# Patient Record
Sex: MALE | Race: BLACK OR AFRICAN AMERICAN | URBAN - METROPOLITAN AREA
[De-identification: names, ages, dates, MRNs, and addresses within clinical notes are randomized per-mention and may not be internally consistent; named-entity substitution may affect disease eponyms.]

---

## 2020-08-13 ENCOUNTER — OFFICE VISIT (OUTPATIENT)
Dept: URBAN - METROPOLITAN AREA TELEHEALTH 2 | Facility: TELEHEALTH | Age: 60
End: 2020-08-13
Payer: COMMERCIAL

## 2020-08-13 DIAGNOSIS — K50.80 CROHN'S COLITIS: ICD-10-CM

## 2020-08-13 DIAGNOSIS — K29.60 ADENOPAPILLOMATOSIS GASTRICA: ICD-10-CM

## 2020-08-13 DIAGNOSIS — R10.12 LUQ ABDOMINAL PAIN: ICD-10-CM

## 2020-08-13 DIAGNOSIS — R19.7 DIARRHEA: ICD-10-CM

## 2020-08-13 PROCEDURE — 99214 OFFICE O/P EST MOD 30 MIN: CPT | Performed by: INTERNAL MEDICINE

## 2020-08-13 RX ORDER — MESALAMINE 1.2 G/1
4 TABLETS TABLET, DELAYED RELEASE ORAL ONCE A DAY
Qty: 360 TABLET | Refills: 3

## 2020-08-13 RX ORDER — FAMOTIDINE 40 MG/1
1 TABLET AT BEDTIME TABLET, FILM COATED ORAL ONCE A DAY
Qty: 90 TABLET | Refills: 3

## 2020-08-13 RX ORDER — OMEPRAZOLE 40 MG/1
1 CAPSULE CAPSULE, DELAYED RELEASE ORAL TWICE DAILY
Qty: 180 | Refills: 3

## 2020-08-13 NOTE — HPI-OTHER HISTORIES
wt  decr 19# over 1y (was 154)  h/o Crohn's  On Lialda daily, sx persistent  Abd pain LUQ Nausea w/o emesis gas regurgitation diarrhea 2 loose BM per day - denies bleeding  assoc w gas, bloating, anorexia  On PPI and H2B reflux under good cntrl  2018 EGD hiatal hernia and gastritis Colon ICV ulceration  2019 CT enterography submucosal fatty infiltration distal terminal ileum c/w ileitis mild fatty liver  WBC 9 HCT 40 Plt 190 K 4.4 Cr 0.9 ALT 24 AST 33  TB 0.6 CRP 8

## 2022-02-04 ENCOUNTER — TELEPHONE ENCOUNTER (OUTPATIENT)
Dept: URBAN - METROPOLITAN AREA CLINIC 92 | Facility: CLINIC | Age: 62
End: 2022-02-04

## 2022-02-04 ENCOUNTER — OFFICE VISIT (OUTPATIENT)
Dept: URBAN - METROPOLITAN AREA TELEHEALTH 2 | Facility: TELEHEALTH | Age: 62
End: 2022-02-04
Payer: COMMERCIAL

## 2022-02-04 DIAGNOSIS — K21.9 GERD (GASTROESOPHAGEAL REFLUX DISEASE): ICD-10-CM

## 2022-02-04 DIAGNOSIS — K50.80 CROHN'S COLITIS: ICD-10-CM

## 2022-02-04 PROCEDURE — 99214 OFFICE O/P EST MOD 30 MIN: CPT | Performed by: INTERNAL MEDICINE

## 2022-02-04 RX ORDER — OMEPRAZOLE 40 MG/1
1 CAPSULE CAPSULE, DELAYED RELEASE ORAL TWICE DAILY
Qty: 180 | Refills: 3 | Status: ACTIVE | COMMUNITY

## 2022-02-04 RX ORDER — MESALAMINE 1.2 G/1
4 TABLETS TABLET, DELAYED RELEASE ORAL ONCE A DAY
Qty: 360 | Refills: 3

## 2022-02-04 RX ORDER — MESALAMINE 1.2 G/1
4 TABLETS TABLET, DELAYED RELEASE ORAL ONCE A DAY
Qty: 360 TABLET | Refills: 3 | Status: ACTIVE | COMMUNITY

## 2022-02-04 RX ORDER — OMEPRAZOLE 40 MG/1
1 CAPSULE CAPSULE, DELAYED RELEASE ORAL TWICE DAILY
Qty: 180 | Refills: 3

## 2022-02-04 RX ORDER — FAMOTIDINE 40 MG/1
1 TABLET AT BEDTIME TABLET, FILM COATED ORAL ONCE A DAY
Qty: 90 TABLET | Refills: 3 | Status: ACTIVE | COMMUNITY

## 2022-02-04 RX ORDER — FAMOTIDINE 40 MG/1
1 TABLET AT BEDTIME TABLET, FILM COATED ORAL ONCE A DAY
Qty: 90 | Refills: 3

## 2022-02-04 NOTE — HPI-OTHER HISTORIES
wt  incr 29# over 1.5y (was 135)  h/o Crohn's  surveillance colon ordered 1.5y ago, not scheduled  On Lialda daily, sx all resolved denies abd pain diarrhea hematochezia or wt loss  On PPI and H2B reflux under good cntrl  2018 EGD hiatal hernia and gastritis Colon ICV ulceration  2019 CT enterography submucosal fatty infiltration distal terminal ileum c/w ileitis mild fatty liver  ROS/ pneumonia, hospitalized at [facility] 6mo ago, now resolved

## 2022-04-20 ENCOUNTER — TELEPHONE ENCOUNTER (OUTPATIENT)
Dept: URBAN - METROPOLITAN AREA CLINIC 92 | Facility: CLINIC | Age: 62
End: 2022-04-20

## 2022-04-25 ENCOUNTER — CLAIMS CREATED FROM THE CLAIM WINDOW (OUTPATIENT)
Dept: URBAN - METROPOLITAN AREA CLINIC 4 | Facility: CLINIC | Age: 62
End: 2022-04-25
Payer: COMMERCIAL

## 2022-04-25 ENCOUNTER — OFFICE VISIT (OUTPATIENT)
Dept: URBAN - METROPOLITAN AREA SURGERY CENTER 16 | Facility: SURGERY CENTER | Age: 62
End: 2022-04-25
Payer: COMMERCIAL

## 2022-04-25 DIAGNOSIS — K50.80 CROHN'S COLITIS: ICD-10-CM

## 2022-04-25 DIAGNOSIS — K63.89 CYST OF DUODENUM: ICD-10-CM

## 2022-04-25 PROCEDURE — 45380 COLONOSCOPY AND BIOPSY: CPT | Performed by: INTERNAL MEDICINE

## 2022-04-25 PROCEDURE — G8907 PT DOC NO EVENTS ON DISCHARG: HCPCS | Performed by: INTERNAL MEDICINE

## 2022-04-25 PROCEDURE — 88305 TISSUE EXAM BY PATHOLOGIST: CPT | Performed by: PATHOLOGY

## 2022-04-29 ENCOUNTER — OFFICE VISIT (OUTPATIENT)
Dept: URBAN - METROPOLITAN AREA TELEHEALTH 2 | Facility: TELEHEALTH | Age: 62
End: 2022-04-29
Payer: COMMERCIAL

## 2022-04-29 DIAGNOSIS — R19.7 DIARRHEA: ICD-10-CM

## 2022-04-29 DIAGNOSIS — K44.9 HIATAL HERNIA: ICD-10-CM

## 2022-04-29 DIAGNOSIS — R11.0 NAUSEA: ICD-10-CM

## 2022-04-29 DIAGNOSIS — R63.0 ANOREXIA: ICD-10-CM

## 2022-04-29 DIAGNOSIS — K50.80 CROHN'S COLITIS: ICD-10-CM

## 2022-04-29 DIAGNOSIS — K76.0 FATTY LIVER: ICD-10-CM

## 2022-04-29 DIAGNOSIS — R63.4 WEIGHT LOSS: ICD-10-CM

## 2022-04-29 DIAGNOSIS — K50.00 ILEITIS, TERMINAL: ICD-10-CM

## 2022-04-29 DIAGNOSIS — K29.60 ADENOPAPILLOMATOSIS GASTRICA: ICD-10-CM

## 2022-04-29 DIAGNOSIS — R10.12 LUQ ABDOMINAL PAIN: ICD-10-CM

## 2022-04-29 DIAGNOSIS — K50.90 CROHN DISEASE: ICD-10-CM

## 2022-04-29 DIAGNOSIS — K29.70 GASTRITIS: ICD-10-CM

## 2022-04-29 DIAGNOSIS — K21.9 GERD (GASTROESOPHAGEAL REFLUX DISEASE): ICD-10-CM

## 2022-04-29 PROCEDURE — 99214 OFFICE O/P EST MOD 30 MIN: CPT | Performed by: INTERNAL MEDICINE

## 2022-04-29 RX ORDER — OMEPRAZOLE 40 MG/1
1 CAPSULE CAPSULE, DELAYED RELEASE ORAL TWICE DAILY
Qty: 180 | Refills: 3 | COMMUNITY

## 2022-04-29 RX ORDER — FAMOTIDINE 40 MG/1
1 TABLET AT BEDTIME TABLET, FILM COATED ORAL ONCE A DAY
Qty: 90 | Refills: 3 | COMMUNITY

## 2022-04-29 RX ORDER — MESALAMINE 1.2 G/1
4 TABLETS TABLET, DELAYED RELEASE ORAL ONCE A DAY
Qty: 360 | Refills: 3 | COMMUNITY

## 2022-04-29 RX ORDER — MESALAMINE 1.2 G/1
4 TABLETS TABLET, DELAYED RELEASE ORAL ONCE A DAY
Qty: 360 TABLET | Refills: 3

## 2022-04-29 RX ORDER — FAMOTIDINE 40 MG/1
1 TABLET AT BEDTIME TABLET, FILM COATED ORAL ONCE A DAY
Qty: 90 TABLET | Refills: 3

## 2022-04-29 RX ORDER — OMEPRAZOLE 40 MG/1
1 CAPSULE CAPSULE, DELAYED RELEASE ORAL TWICE DAILY
Qty: 180 | Refills: 3

## 2022-04-29 NOTE — HPI-OTHER HISTORIES
wt decr 5# over 3mo (was 164)  colonoscopy 4/25/22 normal bx's WNL  h/o Crohn's  On Lialda daily, sx all resolved denies abd pain diarrhea hematochezia or wt loss  On PPI and H2B reflux under good cntrl  2018 EGD hiatal hernia and gastritis Colon ICV ulceration  2019 CT enterography submucosal fatty infiltration distal terminal ileum c/w ileitis mild fatty liver  ROS/ pneumonia, hospitalized at [facility] 6mo ago, now resolved

## 2022-09-13 ENCOUNTER — TELEPHONE ENCOUNTER (OUTPATIENT)
Dept: URBAN - METROPOLITAN AREA CLINIC 92 | Facility: CLINIC | Age: 62
End: 2022-09-13

## 2022-10-02 PROBLEM — 56689002 CROHN'S DISEASE OF SMALL INTESTINE: Status: ACTIVE | Noted: 2022-04-20

## 2022-10-02 PROBLEM — 197321007 FATTY LIVER: Status: ACTIVE | Noted: 2022-04-20

## 2022-10-02 PROBLEM — 4556007 GASTRITIS: Status: ACTIVE | Noted: 2022-04-20

## 2022-10-02 PROBLEM — 79890006 ANOREXIA: Status: ACTIVE | Noted: 2020-08-13

## 2022-10-02 PROBLEM — 235595009 GASTROESOPHAGEAL REFLUX DISEASE: Status: ACTIVE | Noted: 2022-02-04

## 2022-10-06 ENCOUNTER — OFFICE VISIT (OUTPATIENT)
Dept: URBAN - METROPOLITAN AREA TELEHEALTH 2 | Facility: TELEHEALTH | Age: 62
End: 2022-10-06
Payer: COMMERCIAL

## 2022-10-06 ENCOUNTER — TELEPHONE ENCOUNTER (OUTPATIENT)
Dept: URBAN - METROPOLITAN AREA CLINIC 92 | Facility: CLINIC | Age: 62
End: 2022-10-06

## 2022-10-06 VITALS — HEIGHT: 69 IN | BODY MASS INDEX: 19.26 KG/M2 | WEIGHT: 130 LBS

## 2022-10-06 DIAGNOSIS — K50.80 CROHN'S COLITIS: ICD-10-CM

## 2022-10-06 DIAGNOSIS — R10.12 LUQ ABDOMINAL PAIN: ICD-10-CM

## 2022-10-06 DIAGNOSIS — R19.7 DIARRHEA: ICD-10-CM

## 2022-10-06 DIAGNOSIS — K21.9 GERD (GASTROESOPHAGEAL REFLUX DISEASE): ICD-10-CM

## 2022-10-06 PROCEDURE — 99214 OFFICE O/P EST MOD 30 MIN: CPT | Performed by: INTERNAL MEDICINE

## 2022-10-06 RX ORDER — MESALAMINE 1.2 G/1
4 TABLETS TABLET, DELAYED RELEASE ORAL ONCE A DAY
Qty: 360 TABLET | Refills: 3

## 2022-10-06 RX ORDER — OMEPRAZOLE 40 MG/1
1 CAPSULE CAPSULE, DELAYED RELEASE ORAL TWICE DAILY
Qty: 180 | Refills: 3 | COMMUNITY

## 2022-10-06 RX ORDER — FAMOTIDINE 40 MG/1
1 TABLET AT BEDTIME TABLET, FILM COATED ORAL ONCE A DAY
Qty: 90 TABLET | Refills: 3 | COMMUNITY

## 2022-10-06 RX ORDER — OMEPRAZOLE 40 MG/1
1 CAPSULE CAPSULE, DELAYED RELEASE ORAL TWICE DAILY
Qty: 180 | Refills: 3

## 2022-10-06 RX ORDER — MESALAMINE 1.2 G/1
4 TABLETS TABLET, DELAYED RELEASE ORAL ONCE A DAY
Qty: 360 TABLET | Refills: 3 | COMMUNITY

## 2022-10-06 RX ORDER — FAMOTIDINE 40 MG/1
1 TABLET AT BEDTIME TABLET, FILM COATED ORAL ONCE A DAY
Qty: 90 TABLET | Refills: 3

## 2023-04-10 NOTE — HPI-OTHER HISTORIES
wt decr 29# over 6mo (was 159)  attributes wt loss to recent bout of covid  colonoscopy 4/25/22 normal bx's WNL  h/o Crohn's  On Lialda daily, still has 3BM per day  On PPI and H2B reflux under good cntrl  2018 EGD hiatal hernia and gastritis Colon ICV ulceration  2019 CT enterography submucosal fatty infiltration distal terminal ileum c/w ileitis mild fatty liver  ROS/ pneumonia, hospitalized at [facility] 6mo ago, now resolved
Statement Selected

## 2023-11-10 ENCOUNTER — LAB OUTSIDE AN ENCOUNTER (OUTPATIENT)
Dept: URBAN - METROPOLITAN AREA CLINIC 92 | Facility: CLINIC | Age: 63
End: 2023-11-10

## 2023-11-10 ENCOUNTER — OFFICE VISIT (OUTPATIENT)
Dept: URBAN - METROPOLITAN AREA CLINIC 92 | Facility: CLINIC | Age: 63
End: 2023-11-10
Payer: COMMERCIAL

## 2023-11-10 VITALS
HEART RATE: 93 BPM | SYSTOLIC BLOOD PRESSURE: 140 MMHG | HEIGHT: 69 IN | TEMPERATURE: 96.8 F | DIASTOLIC BLOOD PRESSURE: 91 MMHG | WEIGHT: 146.2 LBS | BODY MASS INDEX: 21.66 KG/M2

## 2023-11-10 DIAGNOSIS — K50.90 CROHN DISEASE: ICD-10-CM

## 2023-11-10 DIAGNOSIS — K21.9 GERD (GASTROESOPHAGEAL REFLUX DISEASE): ICD-10-CM

## 2023-11-10 PROCEDURE — 99214 OFFICE O/P EST MOD 30 MIN: CPT

## 2023-11-10 RX ORDER — OMEPRAZOLE 40 MG/1
1 CAPSULE 30 MINUTES BEFORE MORNING MEAL CAPSULE, DELAYED RELEASE ORAL ONCE A DAY
Qty: 90 | Refills: 3 | OUTPATIENT
Start: 2023-11-10

## 2023-11-10 RX ORDER — MESALAMINE 1.2 G/1
4 TABLETS TABLET, DELAYED RELEASE ORAL ONCE A DAY
Qty: 360 TABLET | Refills: 3
End: 2024-11-04

## 2023-11-10 RX ORDER — MESALAMINE 1.2 G/1
4 TABLETS TABLET, DELAYED RELEASE ORAL ONCE A DAY
Qty: 360 TABLET | Refills: 3 | Status: ON HOLD | COMMUNITY

## 2023-11-10 RX ORDER — FAMOTIDINE 40 MG/1
1 TABLET AT BEDTIME TABLET, FILM COATED ORAL ONCE A DAY
Qty: 90 TABLET | Refills: 3 | Status: ON HOLD | COMMUNITY

## 2023-11-10 RX ORDER — OMEPRAZOLE 40 MG/1
1 CAPSULE CAPSULE, DELAYED RELEASE ORAL TWICE DAILY
Qty: 180 | Refills: 3 | Status: ON HOLD | COMMUNITY

## 2023-11-10 NOTE — HPI-TODAY'S VISIT:
63-year-old male presents today for Crohn's disease.    He was previously seen by Dr. Rubio October 2022.  Per record review patient was first diagnosed with Crohn's disease in 1999.  He has been treated with mesalamine, 6-MP in the past  His last colonoscopy was 4- this demonstrated no abnormalities throughout the colon random colon biopsy was negative.  He had a colonoscopy in 2018 that demonstrated normal colon and TI a single solitary ulcer at the IC valve biopsy showed focal active enteritis, mild chronic active colitis in the transverse, descending colon. He did have a CTE July 2019 that demonstrated submucosal fatty infiltration of the distal ileum small bowel loops and terminal ileum this is nonspecific can be seen in chronic inflammation/ileitis.  No definitive bowel wall thickening to suggest active inflammation. He is also noted to be on omeprazole 40 mg daily and famotidine 40 mg For reflux.  His last upper endoscopy was 5-2018 this demonstrated a 2 cm hiatal hernia biopsies demonstrated acute erosive gastritis in the antrum, no HP or IM. Last labs are from 2019  Currently states that he has been off all meds for about 1 year. This week started to note generalized abd pain after eating that has been getting worse over time. He also has associated nausea and vomiting daily. States his body feels hot all over but no fevers or chills. His BM are loose which he states his his normal. Has 3 BM daily no urgency, nighttime sx, hematochezia or melena.  He does still has regurg and heartburn daily. No dys, ja or nsaid use.

## 2023-11-14 ENCOUNTER — TELEPHONE ENCOUNTER (OUTPATIENT)
Dept: URBAN - METROPOLITAN AREA CLINIC 92 | Facility: CLINIC | Age: 63
End: 2023-11-14

## 2023-11-15 LAB
A/G RATIO: 1
ABSOLUTE BASOPHILS: 28
ABSOLUTE EOSINOPHILS: 364
ABSOLUTE LYMPHOCYTES: 1449
ABSOLUTE MONOCYTES: 581
ABSOLUTE NEUTROPHILS: 4578
ALBUMIN: 4.1
ALKALINE PHOSPHATASE: 84
ALT (SGPT): 21
AST (SGOT): 34
BASOPHILS: 0.4
BILIRUBIN, TOTAL: 0.7
BUN/CREATININE RATIO: (no result)
BUN: 23
C-REACTIVE PROTEIN, QUANT: 9.6
CALCIUM: 9.5
CARBON DIOXIDE, TOTAL: 26
CHLORIDE: 105
CREATININE: 0.93
EGFR: 92
EOSINOPHILS: 5.2
GLOBULIN, TOTAL: 4.3
GLUCOSE: 85
HEMATOCRIT: 45.6
HEMOGLOBIN: 14.2
HEPATITIS B CORE AB TOTAL: (no result)
HEPATITIS B SURFACE AB IMMUNITY, QN: <5
HEPATITIS B SURFACE ANTIGEN: (no result)
LYMPHOCYTES: 20.7
MCH: 26.5
MCHC: 31.1
MCV: 85.1
MITOGEN-NIL: 9.18
MONOCYTES: 8.3
MPV: 11.5
NEUTROPHILS: 65.4
PLATELET COUNT: 209
POTASSIUM: 4.1
PROTEIN, TOTAL: 8.4
QUANTIFERON NIL VALUE: 0.05
QUANTIFERON TB1 AG VALUE: 0
QUANTIFERON TB2 AG VALUE: 0
QUANTIFERON-TB GOLD PLUS: NEGATIVE
RDW: 14.5
RED BLOOD CELL COUNT: 5.36
SODIUM: 142
VITAMIN D,25-OH,TOTAL,IA: 26
WHITE BLOOD CELL COUNT: 7

## 2023-11-16 ENCOUNTER — TELEPHONE ENCOUNTER (OUTPATIENT)
Dept: URBAN - METROPOLITAN AREA CLINIC 92 | Facility: CLINIC | Age: 63
End: 2023-11-16

## 2023-11-16 RX ORDER — ERGOCALCIFEROL CAPSULES, 1.25 MG/1
1 CAPSULE CAPSULE ORAL
Qty: 8 | Refills: 0 | OUTPATIENT
Start: 2023-11-16 | End: 2024-01-15

## 2023-11-27 ENCOUNTER — TELEPHONE ENCOUNTER (OUTPATIENT)
Dept: URBAN - METROPOLITAN AREA CLINIC 92 | Facility: CLINIC | Age: 63
End: 2023-11-27

## 2023-11-28 ENCOUNTER — CLAIMS CREATED FROM THE CLAIM WINDOW (OUTPATIENT)
Dept: URBAN - METROPOLITAN AREA MEDICAL CENTER 33 | Facility: MEDICAL CENTER | Age: 63
End: 2023-11-28

## 2023-11-28 ENCOUNTER — CLAIMS CREATED FROM THE CLAIM WINDOW (OUTPATIENT)
Dept: URBAN - METROPOLITAN AREA MEDICAL CENTER 33 | Facility: MEDICAL CENTER | Age: 63
End: 2023-11-28
Payer: COMMERCIAL

## 2023-11-28 DIAGNOSIS — K80.70 CALCULUS OF GALLBLADDER AND BILE DUCT WITHOUT CHOLECYSTITIS OR OBSTRUCTION: ICD-10-CM

## 2023-11-28 DIAGNOSIS — K50.90 ABDOMINAL PAIN DESPITE THERAPY FOR CROHN'S DISEASE: ICD-10-CM

## 2023-11-28 PROCEDURE — 99253 IP/OBS CNSLTJ NEW/EST LOW 45: CPT | Performed by: INTERNAL MEDICINE

## 2023-11-28 PROCEDURE — 99253 IP/OBS CNSLTJ NEW/EST LOW 45: CPT

## 2023-11-28 PROCEDURE — 99221 1ST HOSP IP/OBS SF/LOW 40: CPT | Performed by: INTERNAL MEDICINE

## 2023-11-28 PROCEDURE — G8427 DOCREV CUR MEDS BY ELIG CLIN: HCPCS | Performed by: INTERNAL MEDICINE

## 2023-11-29 ENCOUNTER — CLAIMS CREATED FROM THE CLAIM WINDOW (OUTPATIENT)
Dept: URBAN - METROPOLITAN AREA MEDICAL CENTER 33 | Facility: MEDICAL CENTER | Age: 63
End: 2023-11-29
Payer: COMMERCIAL

## 2023-11-29 ENCOUNTER — CLAIMS CREATED FROM THE CLAIM WINDOW (OUTPATIENT)
Dept: URBAN - METROPOLITAN AREA MEDICAL CENTER 33 | Facility: MEDICAL CENTER | Age: 63
End: 2023-11-29

## 2023-11-29 DIAGNOSIS — K80.30 CALCULUS OF BILE DUCT W CHOLANGITIS, UNSP, W/O OBSTRUCTION: ICD-10-CM

## 2023-11-29 PROCEDURE — 74328 X-RAY BILE DUCT ENDOSCOPY: CPT | Performed by: INTERNAL MEDICINE

## 2023-11-29 PROCEDURE — 43264 ERCP REMOVE DUCT CALCULI: CPT | Performed by: INTERNAL MEDICINE

## 2023-11-29 PROCEDURE — 43274 ERCP DUCT STENT PLACEMENT: CPT | Performed by: INTERNAL MEDICINE

## 2023-11-30 ENCOUNTER — CLAIMS CREATED FROM THE CLAIM WINDOW (OUTPATIENT)
Dept: URBAN - METROPOLITAN AREA MEDICAL CENTER 33 | Facility: MEDICAL CENTER | Age: 63
End: 2023-11-30
Payer: COMMERCIAL

## 2023-11-30 DIAGNOSIS — R79.89 ABNORMAL BILIRUBIN TEST: ICD-10-CM

## 2023-11-30 DIAGNOSIS — K83.8 ACQUIRED DILATION OF BILE DUCT: ICD-10-CM

## 2023-11-30 DIAGNOSIS — K50.90 ABDOMINAL PAIN DESPITE THERAPY FOR CROHN'S DISEASE: ICD-10-CM

## 2023-11-30 DIAGNOSIS — K80.50 BILE DUCT CALCULUS: ICD-10-CM

## 2023-11-30 PROCEDURE — 99231 SBSQ HOSP IP/OBS SF/LOW 25: CPT | Performed by: INTERNAL MEDICINE

## 2023-12-01 ENCOUNTER — DASHBOARD ENCOUNTERS (OUTPATIENT)
Age: 63
End: 2023-12-01

## 2023-12-05 ENCOUNTER — TELEPHONE ENCOUNTER (OUTPATIENT)
Dept: URBAN - METROPOLITAN AREA CLINIC 92 | Facility: CLINIC | Age: 63
End: 2023-12-05

## 2023-12-06 ENCOUNTER — OFFICE VISIT (OUTPATIENT)
Dept: URBAN - METROPOLITAN AREA CLINIC 92 | Facility: CLINIC | Age: 63
End: 2023-12-06

## 2023-12-06 RX ORDER — OMEPRAZOLE 40 MG/1
1 CAPSULE 30 MINUTES BEFORE MORNING MEAL CAPSULE, DELAYED RELEASE ORAL ONCE A DAY
Qty: 90 | Refills: 3 | OUTPATIENT

## 2023-12-06 RX ORDER — MESALAMINE 1.2 G/1
4 TABLETS TABLET, DELAYED RELEASE ORAL ONCE A DAY
Qty: 360 TABLET | Refills: 3 | COMMUNITY
End: 2024-11-04

## 2023-12-06 RX ORDER — ERGOCALCIFEROL CAPSULES, 1.25 MG/1
1 CAPSULE CAPSULE ORAL
Qty: 8 | Refills: 0 | COMMUNITY
Start: 2023-11-16 | End: 2024-01-15

## 2023-12-06 RX ORDER — MESALAMINE 1.2 G/1
4 TABLETS TABLET, DELAYED RELEASE ORAL ONCE A DAY
Qty: 360 TABLET | Refills: 3

## 2023-12-06 RX ORDER — OMEPRAZOLE 40 MG/1
1 CAPSULE 30 MINUTES BEFORE MORNING MEAL CAPSULE, DELAYED RELEASE ORAL ONCE A DAY
Qty: 90 | Refills: 3 | COMMUNITY
Start: 2023-11-10

## 2023-12-06 RX ORDER — FAMOTIDINE 40 MG/1
1 TABLET AT BEDTIME TABLET, FILM COATED ORAL ONCE A DAY
Qty: 90 TABLET | Refills: 3 | COMMUNITY

## 2023-12-06 RX ORDER — OMEPRAZOLE 40 MG/1
1 CAPSULE CAPSULE, DELAYED RELEASE ORAL TWICE DAILY
Qty: 180 | Refills: 3 | COMMUNITY

## 2023-12-06 NOTE — HPI-TODAY'S VISIT:
63-year-old male presents today for Crohn's disease.    He was previously seen by Dr. Rubio October 2022.  Per record review patient was first diagnosed with Crohn's disease in 1999.  He has been treated with mesalamine, 6-MP in the past  His last colonoscopy was 4- this demonstrated no abnormalities throughout the colon random colon biopsy was negative.  He had a colonoscopy in 2018 that demonstrated normal colon and TI a single solitary ulcer at the IC valve biopsy showed focal active enteritis, mild chronic active colitis in the transverse, descending colon. He did have a CTE July 2019 that demonstrated submucosal fatty infiltration of the distal ileum small bowel loops and terminal ileum this is nonspecific can be seen in chronic inflammation/ileitis.  No definitive bowel wall thickening to suggest active inflammation. He is also noted to be on omeprazole 40 mg daily and famotidine 40 mg For reflux.  His last upper endoscopy was 5-2018 this demonstrated a 2 cm hiatal hernia biopsies demonstrated acute erosive gastritis in the antrum, no HP or IM. Last labs are from 2019  Currently states that he has been off all meds for about 1 year. This week started to note generalized abd pain after eating that has been getting worse over time. He also has associated nausea and vomiting daily. States his body feels hot all over but no fevers or chills. His BM are loose which he states his his normal. Has 3 BM daily no urgency, nighttime sx, hematochezia or melena.  He does still has regurg and heartburn daily. No dys, ja or nsaid use. After last visit patient's labs demonstrated no TB, not immune to hepatitis B.  CBC was negative.  CMP demonstrated protein 8.4 globulin 4.3 both elevated.  Decreased vitamin D at 26.  CRP 9.6.  He did not do calprotectin or stool studies.  He did not have a CTE done. He presented to [facility] on 11-28 with right upper quadrant abdominal pain that began that night and woke him up.  He had a CT scan that demonstrated distal common bile duct obstruction possible 5 mm obstructing gallstone within the distal common bile duct near the ampulla.  Associated moderate intra and extrahepatic biliary dilation.  Recommend further evaluation with ERCP.  Gallbladder is distended but appears thin-walled with trace Lucho cholecystic fluid this is favored to be suspected due to common bile duct obstruction.  Early acute cholecystitis felt to be less likely.  No acute appendicitis labs demonstrated elevated bilirubin at 2.1, AST 61, ALT 34, lipase normal at 63.  He had a ERCP with Dr. BRADY on 11- that demonstrated filling defect consistent with stone and sludge as seen on cholangiogram.  Choledocholithiasis was found complete removal was accomplished by biliary sphincterectomy and balloon extraction.  1 covered metal stent was placed in the CBD.  He was told to be evaluated for cholecystectomy and schedule office procedure in 1 to 2 months for stent removal. Patient ended up having a cholecystectomy on 12-1-23.  Postprocedure patient was feeling well.  He did have a new onset of paroxysmal A-fib in the hospital and was placed on low-dose metoprolol twice daily.

## 2023-12-18 ENCOUNTER — TELEPHONE ENCOUNTER (OUTPATIENT)
Dept: URBAN - METROPOLITAN AREA CLINIC 105 | Facility: CLINIC | Age: 63
End: 2023-12-18

## 2023-12-26 ENCOUNTER — TELEPHONE ENCOUNTER (OUTPATIENT)
Dept: URBAN - METROPOLITAN AREA CLINIC 92 | Facility: CLINIC | Age: 63
End: 2023-12-26

## 2024-02-07 ENCOUNTER — ERCP (OUTPATIENT)
Dept: URBAN - METROPOLITAN AREA MEDICAL CENTER 12 | Facility: MEDICAL CENTER | Age: 64
End: 2024-02-07